# Patient Record
Sex: FEMALE | URBAN - METROPOLITAN AREA
[De-identification: names, ages, dates, MRNs, and addresses within clinical notes are randomized per-mention and may not be internally consistent; named-entity substitution may affect disease eponyms.]

---

## 2024-05-01 ENCOUNTER — HOME HEALTH ADMISSION (OUTPATIENT)
Dept: HOME HEALTH SERVICES | Facility: HOME HEALTH | Age: 79
End: 2024-05-01
Payer: MEDICARE

## 2024-05-02 ENCOUNTER — HOME CARE VISIT (OUTPATIENT)
Dept: SCHEDULING | Facility: HOME HEALTH | Age: 79
End: 2024-05-02

## 2024-05-02 PROCEDURE — G0151 HHCP-SERV OF PT,EA 15 MIN: HCPCS

## 2024-05-02 PROCEDURE — 0221000100 HH NO PAY CLAIM PROCEDURE

## 2024-05-03 ASSESSMENT — ENCOUNTER SYMPTOMS: HEMOPTYSIS: 0

## 2024-05-03 NOTE — HOME HEALTH
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  Caregiver is available: 24/7  Caregiver present at this visit and will participate with clinician.  Medications reconciled and all medications are available in the home this visit.   The following education was provided regarding medications: medication interactions: NA. Patient/CG able to demonstrate knowledge through teach back with 100 percent accuracy.    Following medication reconciliation, the physician NA notified of high risk medication interactions within 24 hours. A list of reconciled medications will be given to the patient/caregiver and a copy has been uploaded to media.    Objective data:    BED MOBILITY: supervision  TRANSFERS:min  GAIT:min  ASSISTIVE DEVICES USED CURRENTLY:rollator and 2WW  ASSISTIVE DEVICES NEEDED:none  ADLS:min  IADLS:moderate assistance  FUNCTIONAL STRENGTH 3+/5 with her MMT   ROM DEFICITS:limited hip extension   BALANCE:client scored 3+/5  VESTIBULAR ASSESSMENT:WNL  PROPRIOCEPTION:WNL  OBJECTIVE TESTS (MINIMUM OF 2): TUG 24 seconds and Tinetti 19/28  WOUNDS:WNL    Assessment of evaluation:Patient has SOB with exertion and fatigues easily that warrants home health PT as she has YAHAIRA LE weakness and fatigues easily along with scoring as a high fall risk with her balance testing that warrans home health PT.    Patient/caregiver instructed on plan of care and are agreeable to plan of care at this time.    Clinician reviewed orientation to home health booklet with patient/caregiver including agency phone number, agency complaint process, state hotline number, as well as joint commission's quality hotline number. Consent forms signed.   Patient at risk for falls yes  Discharge planning discussed with patient and/OR caregiver.

## 2024-05-06 ENCOUNTER — HOME CARE VISIT (OUTPATIENT)
Dept: SCHEDULING | Facility: HOME HEALTH | Age: 79
End: 2024-05-06

## 2024-05-06 PROCEDURE — G0151 HHCP-SERV OF PT,EA 15 MIN: HCPCS

## 2024-05-08 ENCOUNTER — HOME CARE VISIT (OUTPATIENT)
Dept: HOME HEALTH SERVICES | Facility: HOME HEALTH | Age: 79
End: 2024-05-08

## 2024-05-08 VITALS
DIASTOLIC BLOOD PRESSURE: 65 MMHG | TEMPERATURE: 97 F | RESPIRATION RATE: 18 BRPM | HEART RATE: 70 BPM | SYSTOLIC BLOOD PRESSURE: 122 MMHG | OXYGEN SATURATION: 97 %

## 2024-05-08 PROCEDURE — G0151 HHCP-SERV OF PT,EA 15 MIN: HCPCS

## 2024-05-08 NOTE — HOME HEALTH
Cherise reports a positive session today, expressing satisfaction with her progress in addressing endurance capacity and balance challenges. She demonstrates motivation and enthusiasm for the prescribed exercises aimed at improving her functional abilities.    Objective:    Endurance Training:  Cherise actively engaged in a series of endurance-building exercises targeting various muscle groups and cardiovascular system.  She demonstrated improved tolerance to prolonged activity and exhibited less fatigue compared to previous sessions.  Balance Training:  Cherise participated in balance exercises encompassing all planes of movement, including anterior-posterior, medial-lateral, and rotational stability.  She displayed enhanced stability and control during challenging balance tasks, with improved weight shifting and proprioceptive awareness.  Closed Chain Strengthening:  Utilized closed chain strengthening exercises to target lower extremity muscles while promoting joint stability and proprioception.  Exercises included sit-to-stands, transfers, sidestepping, and gastrocnemius strengthening exercises.  Cardiac Challenge:  Implemented shorter rest intervals between sets to increase cardiac demand and challenge Cherise's cardiovascular system.  Cherise demonstrated improved cardiovascular fitness and tolerance to increased workload during the session.  Assessment:  Cherise had a productive session focused on improving endurance capacity and balance in all planes of movement. She demonstrated excellent effort and motivation throughout the session, resulting in positive gains in functional abilities and cardiovascular fitness.    Plan:    Continue progressive endurance training regimen to further enhance cardiovascular fitness and endurance capacity.  Advance balance exercises to incorporate higher-level challenges and dynamic movement patterns.  Incorporate additional closed chain strengthening exercises targeting specific

## 2024-05-09 VITALS
OXYGEN SATURATION: 97 % | HEART RATE: 72 BPM | TEMPERATURE: 97.8 F | RESPIRATION RATE: 18 BRPM | DIASTOLIC BLOOD PRESSURE: 86 MMHG | SYSTOLIC BLOOD PRESSURE: 120 MMHG

## 2024-05-09 NOTE — HOME HEALTH
Client worked on YAHAIRA LE strengthening with her glutes, quads, gastroc, hamstrings, hip abductors and hip flexors 2 x failure along with decreasing clients rest between sets in order to get back to maximal level of function. Patient worked on increasing her time under tension along with increasing her AROM with VC throughout to do as she was able to do with 75% return demo. Patient worked on increasing her hip hinge and hip abduction for more efficient glut strengthening in order to improve her transfers and hip extension with her gait cycle.

## 2024-05-10 ENCOUNTER — HOME CARE VISIT (OUTPATIENT)
Dept: SCHEDULING | Facility: HOME HEALTH | Age: 79
End: 2024-05-10

## 2024-05-10 PROCEDURE — G0157 HHC PT ASSISTANT EA 15: HCPCS

## 2024-05-12 VITALS
DIASTOLIC BLOOD PRESSURE: 98 MMHG | SYSTOLIC BLOOD PRESSURE: 160 MMHG | OXYGEN SATURATION: 97 % | TEMPERATURE: 98.1 F | HEART RATE: 88 BPM | RESPIRATION RATE: 18 BRPM

## 2024-05-13 ENCOUNTER — HOME CARE VISIT (OUTPATIENT)
Dept: SCHEDULING | Facility: HOME HEALTH | Age: 79
End: 2024-05-13
Payer: MEDICARE

## 2024-05-13 VITALS — DIASTOLIC BLOOD PRESSURE: 88 MMHG | SYSTOLIC BLOOD PRESSURE: 142 MMHG | RESPIRATION RATE: 18 BRPM | TEMPERATURE: 97.7 F

## 2024-05-13 PROCEDURE — G0157 HHC PT ASSISTANT EA 15: HCPCS

## 2024-05-13 ASSESSMENT — ENCOUNTER SYMPTOMS: DYSPNEA ACTIVITY LEVEL: AFTER AMBULATING MORE THAN 20 FT

## 2024-05-14 NOTE — HOME HEALTH
Primary focus of care and skilled reason for visit: Dyspnea upon exertion  Subjective: Pt states she had like 3 or 4 bowel movements this morning. She had a lot of sleep last night but her stomach is bothering her since after taking her medications. She is doing standing exercise in the morning and sitting ones in the evening.   Caregiver is available: pt boyfriend  Caregiver present at this visit and did not participate with clinician.  Medications reconciled and all medications are available in the home this visit.   The following education was provided regarding medications: taking as prescribed  Patient demonstrate knowledge through teach back with 100 percent accuracy.      GAIT: Pt amb with 4WW and SBA unlevel surface; 350ft to improve community ambulation; VC for closer proximity to walker to improve posture. VC multiple times for keeping B feet within walker when making turns to prevent falls. Amb in home with SPC 20ft, Min A; with focus on progressing pt to PLOF with least restrictive device. VC for wider FRANCHESCA and proper 2-point gait pattern.  BALANCE: Instructed pt in following balance exercises to improve dynamic and balance stability; Lateral stepping at kitchen counter 10steps to each side; heel raises and mini squats unsupported x10 reps each. Min unsteadiness with lateral stepping.    Assessment of treatment/improvements or declines from previous treatments: Will require more training with cane to improve safety and independence. Improving in unlevel surface endurance however still shows fall risk especially during turns as pt was unable to show carryover of keeping B feet inside walker when turning putting pt at risk for tripping over walker wheels. Pt continues to remain motivated and following HEP.     Specific plan for next treatment: Gait training with cane, balance training to improve stability, and ensuring safety with use of 4ww when amb unlevel surf.    Discharge planning discussed with

## 2024-05-15 ENCOUNTER — HOME CARE VISIT (OUTPATIENT)
Dept: SCHEDULING | Facility: HOME HEALTH | Age: 79
End: 2024-05-15
Payer: MEDICARE

## 2024-05-15 VITALS
DIASTOLIC BLOOD PRESSURE: 88 MMHG | TEMPERATURE: 98.1 F | SYSTOLIC BLOOD PRESSURE: 142 MMHG | OXYGEN SATURATION: 96 % | HEART RATE: 84 BPM | RESPIRATION RATE: 16 BRPM

## 2024-05-15 PROCEDURE — G0157 HHC PT ASSISTANT EA 15: HCPCS

## 2024-05-15 NOTE — HOME HEALTH
Primary focus of care and skilled reason for visit: dyspnea upon exertion  Subjective: Pt states she had a good sleep last night. She still feels her legs are really weak. Pt states she is not had as much night spasms in her legs and she is elevating her legs which is helping with the spasm.   Caregiver is available: BF  Caregiver present at this visit and did not participate with clinician.  Medications reconciled and all medications are available in the home this visit and managed by pt. She is not taking a multi-vitamin gummies now.     GAIT: Pt amb unlevel surface with 4WW and CGA 300ft and with SPC 100ft with Min A with focus on progressing pt to least restrictive device. VC for keeping wider FRANCHESCA when amb with SPC, for increasing heel strike, and upright posture.   THER EX: Seated therex with focus on improving BLE muscle strength; Noted weakness in core, hip flexion, and hamstrings and pt unable to reach full ROM with hip flexion. Instructed pt in self assisted hip flexion to assist with hands  assisting to end range only. Standing therex 10reps x 2 hip flexion and assisted ham curl as pt unable to complete full range due to weakness,  BALANCE: trained in navigating curbs with 4WW CGA and SPC Mod A. Revieweing sequencing and proper techniques with stepping up with stronger LE and stepping down with weker LE. Pt able to step down utilizing cane with Mod A however unable to step up. pt having difficulty with raising legs high enough to get onto curb. Once pt got LE on curb pt unable to extend knees to step up.    Assessment of treatment/improvements or declines from previous treatments:    Specific plan for next treatment: Gait training with cane, therex to strengthen hams quads and hip flexors for navigating steps, balance for impriving stability.     Discharge planning discussed with patient. Discharge planning as follows: when goals met  Patient verbalize agreement with discharge planning.   Written Teaching

## 2024-05-17 ENCOUNTER — HOME CARE VISIT (OUTPATIENT)
Dept: SCHEDULING | Facility: HOME HEALTH | Age: 79
End: 2024-05-17
Payer: MEDICARE

## 2024-05-17 NOTE — HOME HEALTH
The patient missed a routine visit for physical therapy on 5/17. Pt contacted therapist and states she has not been feeling well for the past 2 days and wanting to cancel therapy for the day.  Called PCP Dr Aparicio and left a voice message on 5/17 at 3:51pm and informed of the missed visit.

## 2024-05-20 ENCOUNTER — HOME CARE VISIT (OUTPATIENT)
Dept: HOME HEALTH SERVICES | Facility: HOME HEALTH | Age: 79
End: 2024-05-20
Payer: MEDICARE